# Patient Record
Sex: MALE | Race: WHITE | URBAN - METROPOLITAN AREA
[De-identification: names, ages, dates, MRNs, and addresses within clinical notes are randomized per-mention and may not be internally consistent; named-entity substitution may affect disease eponyms.]

---

## 2017-05-10 ENCOUNTER — HISTORICAL (OUTPATIENT)
Dept: LAB | Facility: HOSPITAL | Age: 62
End: 2017-05-10

## 2017-05-10 ENCOUNTER — HISTORICAL (OUTPATIENT)
Dept: ADMINISTRATIVE | Facility: HOSPITAL | Age: 62
End: 2017-05-10

## 2017-05-10 LAB — GRAM STN SPEC: NORMAL

## 2017-05-15 LAB — FINAL CULTURE: NORMAL

## 2018-05-02 ENCOUNTER — HISTORICAL (OUTPATIENT)
Dept: ADMINISTRATIVE | Facility: HOSPITAL | Age: 63
End: 2018-05-02

## 2018-05-02 ENCOUNTER — HISTORICAL (OUTPATIENT)
Dept: LAB | Facility: HOSPITAL | Age: 63
End: 2018-05-02

## 2018-05-02 LAB
ABS NEUT (OLG): 5.44 X10(3)/MCL (ref 2.1–9.2)
ALBUMIN SERPL-MCNC: 3.5 GM/DL (ref 3.4–5)
ALBUMIN/GLOB SERPL: 0.9 {RATIO}
ALP SERPL-CCNC: 110 UNIT/L (ref 50–136)
ALT SERPL-CCNC: 31 UNIT/L (ref 12–78)
AST SERPL-CCNC: 28 UNIT/L (ref 15–37)
BASOPHILS # BLD AUTO: 0 X10(3)/MCL (ref 0–0.2)
BASOPHILS NFR BLD AUTO: 1 %
BILIRUB SERPL-MCNC: 0.3 MG/DL (ref 0.2–1)
BILIRUBIN DIRECT+TOT PNL SERPL-MCNC: 0.1 MG/DL (ref 0–0.2)
BILIRUBIN DIRECT+TOT PNL SERPL-MCNC: 0.2 MG/DL (ref 0–0.8)
BUN SERPL-MCNC: 19 MG/DL (ref 7–18)
CALCIUM SERPL-MCNC: 8.4 MG/DL (ref 8.5–10.1)
CHLORIDE SERPL-SCNC: 108 MMOL/L (ref 98–107)
CO2 SERPL-SCNC: 28 MMOL/L (ref 21–32)
CREAT SERPL-MCNC: 0.89 MG/DL (ref 0.7–1.3)
CRP SERPL HS-MCNC: 18.4 MG/L (ref 0–3)
EOSINOPHIL # BLD AUTO: 0.1 X10(3)/MCL (ref 0–0.9)
EOSINOPHIL NFR BLD AUTO: 1 %
ERYTHROCYTE [DISTWIDTH] IN BLOOD BY AUTOMATED COUNT: 12.5 % (ref 11.5–17)
ERYTHROCYTE [SEDIMENTATION RATE] IN BLOOD: 50 MM/HR (ref 0–15)
GLOBULIN SER-MCNC: 3.9 GM/DL (ref 2.4–3.5)
GLUCOSE SERPL-MCNC: 102 MG/DL (ref 74–106)
HCT VFR BLD AUTO: 41.2 % (ref 42–52)
HGB BLD-MCNC: 13.5 GM/DL (ref 14–18)
LYMPHOCYTES # BLD AUTO: 1.4 X10(3)/MCL (ref 0.6–4.6)
LYMPHOCYTES NFR BLD AUTO: 18 %
MCH RBC QN AUTO: 31.9 PG (ref 27–31)
MCHC RBC AUTO-ENTMCNC: 32.8 GM/DL (ref 33–36)
MCV RBC AUTO: 97.4 FL (ref 80–94)
MONOCYTES # BLD AUTO: 0.9 X10(3)/MCL (ref 0.1–1.3)
MONOCYTES NFR BLD AUTO: 11 %
NEUTROPHILS # BLD AUTO: 5.44 X10(3)/MCL (ref 1.4–7.9)
NEUTROPHILS NFR BLD AUTO: 69 %
PLATELET # BLD AUTO: 506 X10(3)/MCL (ref 130–400)
PMV BLD AUTO: 8.9 FL (ref 9.4–12.4)
POTASSIUM SERPL-SCNC: 4.6 MMOL/L (ref 3.5–5.1)
PROT SERPL-MCNC: 7.4 GM/DL (ref 6.4–8.2)
RBC # BLD AUTO: 4.23 X10(6)/MCL (ref 4.7–6.1)
SODIUM SERPL-SCNC: 139 MMOL/L (ref 136–145)
TRANSFERRIN SERPL-MCNC: 242 MG/DL (ref 200–360)
WBC # SPEC AUTO: 7.9 X10(3)/MCL (ref 4.5–11.5)

## 2018-05-03 LAB — GRAM STN SPEC: NORMAL

## 2018-05-06 LAB — FINAL CULTURE: NORMAL

## 2018-05-07 ENCOUNTER — HISTORICAL (OUTPATIENT)
Dept: LAB | Facility: HOSPITAL | Age: 63
End: 2018-05-07

## 2018-05-07 LAB
APPEARANCE, UA: CLEAR
APTT PPP: 39.5 SECOND(S) (ref 24.8–36.9)
BACTERIA SPEC CULT: NORMAL /HPF
BILIRUB UR QL STRIP: NEGATIVE
COLOR UR: YELLOW
GLUCOSE (UA): NEGATIVE
HGB UR QL STRIP: NEGATIVE
INR PPP: 1.09 (ref 0–1.27)
KETONES UR QL STRIP: NEGATIVE
LEUKOCYTE ESTERASE UR QL STRIP: NEGATIVE
NITRITE UR QL STRIP: NEGATIVE
PH UR STRIP: 5.5 [PH] (ref 5–9)
PROT UR QL STRIP: NEGATIVE
PROTHROMBIN TIME: 14.5 SECOND(S) (ref 12.2–14.7)
RBC #/AREA URNS HPF: NORMAL /[HPF]
SP GR UR STRIP: 1.02 (ref 1–1.03)
SQUAMOUS EPITHELIAL, UA: NORMAL
UROBILINOGEN UR STRIP-ACNC: 0.2
WBC #/AREA URNS HPF: NORMAL /[HPF]

## 2018-05-09 LAB — FINAL CULTURE: NORMAL

## 2018-05-30 ENCOUNTER — HISTORICAL (OUTPATIENT)
Dept: ADMINISTRATIVE | Facility: HOSPITAL | Age: 63
End: 2018-05-30

## 2018-10-08 ENCOUNTER — HISTORICAL (OUTPATIENT)
Dept: ADMINISTRATIVE | Facility: HOSPITAL | Age: 63
End: 2018-10-08

## 2019-10-09 ENCOUNTER — HISTORICAL (OUTPATIENT)
Dept: ADMINISTRATIVE | Facility: HOSPITAL | Age: 64
End: 2019-10-09

## 2021-04-26 ENCOUNTER — HISTORICAL (OUTPATIENT)
Dept: ADMINISTRATIVE | Facility: HOSPITAL | Age: 66
End: 2021-04-26

## 2021-05-20 ENCOUNTER — HISTORICAL (OUTPATIENT)
Dept: ADMINISTRATIVE | Facility: HOSPITAL | Age: 66
End: 2021-05-20

## 2021-05-22 LAB — FINAL CULTURE: NORMAL

## 2021-07-22 ENCOUNTER — HISTORICAL (OUTPATIENT)
Dept: ADMINISTRATIVE | Facility: HOSPITAL | Age: 66
End: 2021-07-22

## 2021-07-24 LAB — FINAL CULTURE: NORMAL

## 2021-09-16 ENCOUNTER — HISTORICAL (OUTPATIENT)
Dept: ADMINISTRATIVE | Facility: HOSPITAL | Age: 66
End: 2021-09-16

## 2021-09-16 LAB
ABS NEUT (OLG): 3.72 X10(3)/MCL (ref 2.1–9.2)
ALBUMIN SERPL-MCNC: 4.1 GM/DL (ref 3.4–4.8)
ALBUMIN/GLOB SERPL: 1.3 RATIO (ref 1.1–2)
ALP SERPL-CCNC: 138 UNIT/L (ref 40–150)
ALT SERPL-CCNC: 17 UNIT/L (ref 0–55)
AST SERPL-CCNC: 18 UNIT/L (ref 5–34)
BASOPHILS # BLD AUTO: 0.1 X10(3)/MCL (ref 0–0.2)
BASOPHILS NFR BLD AUTO: 1 %
BILIRUB SERPL-MCNC: 0.4 MG/DL
BILIRUBIN DIRECT+TOT PNL SERPL-MCNC: 0.2 MG/DL (ref 0–0.5)
BILIRUBIN DIRECT+TOT PNL SERPL-MCNC: 0.2 MG/DL (ref 0–0.8)
BUN SERPL-MCNC: 15.1 MG/DL (ref 8.4–25.7)
CALCIUM SERPL-MCNC: 9.2 MG/DL (ref 8.8–10)
CHLORIDE SERPL-SCNC: 106 MMOL/L (ref 98–107)
CO2 SERPL-SCNC: 22 MMOL/L (ref 23–31)
CREAT SERPL-MCNC: 0.85 MG/DL (ref 0.73–1.18)
EOSINOPHIL # BLD AUTO: 0.2 X10(3)/MCL (ref 0–0.9)
EOSINOPHIL NFR BLD AUTO: 3 %
ERYTHROCYTE [DISTWIDTH] IN BLOOD BY AUTOMATED COUNT: 14.3 % (ref 11.5–17)
ERYTHROCYTE [SEDIMENTATION RATE] IN BLOOD: 4 MM/HR (ref 0–15)
GLOBULIN SER-MCNC: 3.1 GM/DL (ref 2.4–3.5)
GLUCOSE SERPL-MCNC: 127 MG/DL (ref 82–115)
HCT VFR BLD AUTO: 42.6 % (ref 42–52)
HGB BLD-MCNC: 12.9 GM/DL (ref 14–18)
LYMPHOCYTES # BLD AUTO: 1.4 X10(3)/MCL (ref 0.6–4.6)
LYMPHOCYTES NFR BLD AUTO: 23 %
MCH RBC QN AUTO: 30.3 PG (ref 27–31)
MCHC RBC AUTO-ENTMCNC: 30.3 GM/DL (ref 33–36)
MCV RBC AUTO: 100 FL (ref 80–94)
MONOCYTES # BLD AUTO: 0.5 X10(3)/MCL (ref 0.1–1.3)
MONOCYTES NFR BLD AUTO: 9 %
NEUTROPHILS # BLD AUTO: 3.72 X10(3)/MCL (ref 2.1–9.2)
NEUTROPHILS NFR BLD AUTO: 62 %
PLATELET # BLD AUTO: 325 X10(3)/MCL (ref 130–400)
PMV BLD AUTO: 10.8 FL (ref 9.4–12.4)
POTASSIUM SERPL-SCNC: 4 MMOL/L (ref 3.5–5.1)
PROT SERPL-MCNC: 7.2 GM/DL (ref 5.8–7.6)
RBC # BLD AUTO: 4.26 X10(6)/MCL (ref 4.7–6.1)
SODIUM SERPL-SCNC: 141 MMOL/L (ref 136–145)
VANCOMYCIN TROUGH SERPL-MCNC: 17.6 UG/ML (ref 15–20)
WBC # SPEC AUTO: 6 X10(3)/MCL (ref 4.5–11.5)

## 2021-09-21 ENCOUNTER — HISTORICAL (OUTPATIENT)
Dept: ADMINISTRATIVE | Facility: HOSPITAL | Age: 66
End: 2021-09-21

## 2021-09-21 LAB
ABS NEUT (OLG): 3.81 X10(3)/MCL (ref 2.1–9.2)
ALBUMIN SERPL-MCNC: 4.4 GM/DL (ref 3.4–4.8)
ALBUMIN/GLOB SERPL: 1.3 RATIO (ref 1.1–2)
ALP SERPL-CCNC: 151 UNIT/L (ref 40–150)
ALT SERPL-CCNC: 21 UNIT/L (ref 0–55)
AST SERPL-CCNC: 20 UNIT/L (ref 5–34)
BASOPHILS # BLD AUTO: 0.1 X10(3)/MCL (ref 0–0.2)
BASOPHILS NFR BLD AUTO: 1 %
BILIRUB SERPL-MCNC: 0.3 MG/DL
BILIRUBIN DIRECT+TOT PNL SERPL-MCNC: 0.1 MG/DL (ref 0–0.5)
BILIRUBIN DIRECT+TOT PNL SERPL-MCNC: 0.2 MG/DL (ref 0–0.8)
BUN SERPL-MCNC: 20.5 MG/DL (ref 8.4–25.7)
CALCIUM SERPL-MCNC: 9.6 MG/DL (ref 8.8–10)
CHLORIDE SERPL-SCNC: 103 MMOL/L (ref 98–107)
CO2 SERPL-SCNC: 23 MMOL/L (ref 23–31)
CREAT SERPL-MCNC: 1 MG/DL (ref 0.73–1.18)
EOSINOPHIL # BLD AUTO: 0.2 X10(3)/MCL (ref 0–0.9)
EOSINOPHIL NFR BLD AUTO: 3 %
ERYTHROCYTE [DISTWIDTH] IN BLOOD BY AUTOMATED COUNT: 13.9 % (ref 11.5–17)
ERYTHROCYTE [SEDIMENTATION RATE] IN BLOOD: 5 MM/HR (ref 0–15)
GLOBULIN SER-MCNC: 3.3 GM/DL (ref 2.4–3.5)
GLUCOSE SERPL-MCNC: 97 MG/DL (ref 82–115)
HCT VFR BLD AUTO: 44.1 % (ref 42–52)
HGB BLD-MCNC: 14 GM/DL (ref 14–18)
LYMPHOCYTES # BLD AUTO: 1.4 X10(3)/MCL (ref 0.6–4.6)
LYMPHOCYTES NFR BLD AUTO: 22 %
MCH RBC QN AUTO: 31 PG (ref 27–31)
MCHC RBC AUTO-ENTMCNC: 31.7 GM/DL (ref 33–36)
MCV RBC AUTO: 97.8 FL (ref 80–94)
MONOCYTES # BLD AUTO: 0.8 X10(3)/MCL (ref 0.1–1.3)
MONOCYTES NFR BLD AUTO: 13 %
NEUTROPHILS # BLD AUTO: 3.81 X10(3)/MCL (ref 2.1–9.2)
NEUTROPHILS NFR BLD AUTO: 60 %
PLATELET # BLD AUTO: 342 X10(3)/MCL (ref 130–400)
PMV BLD AUTO: 10.1 FL (ref 9.4–12.4)
POTASSIUM SERPL-SCNC: 4.5 MMOL/L (ref 3.5–5.1)
PROT SERPL-MCNC: 7.7 GM/DL (ref 5.8–7.6)
RBC # BLD AUTO: 4.51 X10(6)/MCL (ref 4.7–6.1)
SODIUM SERPL-SCNC: 138 MMOL/L (ref 136–145)
VANCOMYCIN TROUGH SERPL-MCNC: 19.1 UG/ML (ref 15–20)
WBC # SPEC AUTO: 6.4 X10(3)/MCL (ref 4.5–11.5)

## 2021-09-23 ENCOUNTER — HISTORICAL (OUTPATIENT)
Dept: ADMINISTRATIVE | Facility: HOSPITAL | Age: 66
End: 2021-09-23

## 2021-09-23 LAB
ALBUMIN SERPL-MCNC: 4.1 GM/DL (ref 3.4–4.8)
ALBUMIN/GLOB SERPL: 1.3 RATIO (ref 1.1–2)
ALP SERPL-CCNC: 134 UNIT/L (ref 40–150)
ALT SERPL-CCNC: 20 UNIT/L (ref 0–55)
AST SERPL-CCNC: 18 UNIT/L (ref 5–34)
BILIRUB SERPL-MCNC: 0.4 MG/DL
BILIRUBIN DIRECT+TOT PNL SERPL-MCNC: 0.2 MG/DL (ref 0–0.5)
BILIRUBIN DIRECT+TOT PNL SERPL-MCNC: 0.2 MG/DL (ref 0–0.8)
BUN SERPL-MCNC: 16.5 MG/DL (ref 8.4–25.7)
CALCIUM SERPL-MCNC: 9.3 MG/DL (ref 8.8–10)
CHLORIDE SERPL-SCNC: 108 MMOL/L (ref 98–107)
CO2 SERPL-SCNC: 21 MMOL/L (ref 23–31)
CREAT SERPL-MCNC: 0.99 MG/DL (ref 0.73–1.18)
CRP SERPL-MCNC: 0 MG/DL (ref 0–1)
ERYTHROCYTE [DISTWIDTH] IN BLOOD BY AUTOMATED COUNT: 13.9 % (ref 11.5–17)
ERYTHROCYTE [SEDIMENTATION RATE] IN BLOOD: 13 MM/HR (ref 0–15)
GLOBULIN SER-MCNC: 3.1 GM/DL (ref 2.4–3.5)
GLUCOSE SERPL-MCNC: 137 MG/DL (ref 82–115)
HCT VFR BLD AUTO: 43.4 % (ref 42–52)
HGB BLD-MCNC: 13.7 GM/DL (ref 14–18)
MCH RBC QN AUTO: 30.6 PG (ref 27–31)
MCHC RBC AUTO-ENTMCNC: 31.6 GM/DL (ref 33–36)
MCV RBC AUTO: 97.1 FL (ref 80–94)
PLATELET # BLD AUTO: 333 X10(3)/MCL (ref 130–400)
PMV BLD AUTO: 9.9 FL (ref 9.4–12.4)
POTASSIUM SERPL-SCNC: 4.3 MMOL/L (ref 3.5–5.1)
PROT SERPL-MCNC: 7.2 GM/DL (ref 5.8–7.6)
RBC # BLD AUTO: 4.47 X10(6)/MCL (ref 4.7–6.1)
SODIUM SERPL-SCNC: 140 MMOL/L (ref 136–145)
VANCOMYCIN TROUGH SERPL-MCNC: 17.7 UG/ML (ref 15–20)
WBC # SPEC AUTO: 5.5 X10(3)/MCL (ref 4.5–11.5)

## 2021-09-28 ENCOUNTER — HISTORICAL (OUTPATIENT)
Dept: ADMINISTRATIVE | Facility: HOSPITAL | Age: 66
End: 2021-09-28

## 2021-09-30 LAB — FINAL CULTURE: NORMAL

## 2021-10-05 ENCOUNTER — HISTORICAL (OUTPATIENT)
Dept: ADMINISTRATIVE | Facility: HOSPITAL | Age: 66
End: 2021-10-05

## 2021-10-05 LAB
ALBUMIN SERPL-MCNC: 3.9 GM/DL (ref 3.4–4.8)
ALBUMIN/GLOB SERPL: 1.3 RATIO (ref 1.1–2)
ALP SERPL-CCNC: 116 UNIT/L (ref 40–150)
ALT SERPL-CCNC: 13 UNIT/L (ref 0–55)
AST SERPL-CCNC: 15 UNIT/L (ref 5–34)
BILIRUB SERPL-MCNC: 0.5 MG/DL
BILIRUBIN DIRECT+TOT PNL SERPL-MCNC: 0.2 MG/DL (ref 0–0.5)
BILIRUBIN DIRECT+TOT PNL SERPL-MCNC: 0.3 MG/DL (ref 0–0.8)
BUN SERPL-MCNC: 12.4 MG/DL (ref 8.4–25.7)
CALCIUM SERPL-MCNC: 8.8 MG/DL (ref 8.8–10)
CHLORIDE SERPL-SCNC: 107 MMOL/L (ref 98–107)
CO2 SERPL-SCNC: 23 MMOL/L (ref 23–31)
CREAT SERPL-MCNC: 0.86 MG/DL (ref 0.73–1.18)
ERYTHROCYTE [DISTWIDTH] IN BLOOD BY AUTOMATED COUNT: 13.2 % (ref 11.5–17)
ERYTHROCYTE [SEDIMENTATION RATE] IN BLOOD: 10 MM/HR (ref 0–15)
GLOBULIN SER-MCNC: 3 GM/DL (ref 2.4–3.5)
GLUCOSE SERPL-MCNC: 98 MG/DL (ref 82–115)
HCT VFR BLD AUTO: 41.6 % (ref 42–52)
HGB BLD-MCNC: 13 GM/DL (ref 14–18)
MCH RBC QN AUTO: 30.2 PG (ref 27–31)
MCHC RBC AUTO-ENTMCNC: 31.3 GM/DL (ref 33–36)
MCV RBC AUTO: 96.7 FL (ref 80–94)
PLATELET # BLD AUTO: 358 X10(3)/MCL (ref 130–400)
PMV BLD AUTO: 9.9 FL (ref 9.4–12.4)
POTASSIUM SERPL-SCNC: 4.2 MMOL/L (ref 3.5–5.1)
PROT SERPL-MCNC: 6.9 GM/DL (ref 5.8–7.6)
RBC # BLD AUTO: 4.3 X10(6)/MCL (ref 4.7–6.1)
SODIUM SERPL-SCNC: 143 MMOL/L (ref 136–145)
VANCOMYCIN TROUGH SERPL-MCNC: 7.1 UG/ML (ref 15–20)
WBC # SPEC AUTO: 5.4 X10(3)/MCL (ref 4.5–11.5)

## 2022-04-11 ENCOUNTER — HISTORICAL (OUTPATIENT)
Dept: ADMINISTRATIVE | Facility: HOSPITAL | Age: 67
End: 2022-04-11
Payer: MEDICARE

## 2022-04-27 VITALS
DIASTOLIC BLOOD PRESSURE: 83 MMHG | SYSTOLIC BLOOD PRESSURE: 132 MMHG | WEIGHT: 202 LBS | BODY MASS INDEX: 29.92 KG/M2 | HEIGHT: 69 IN

## 2022-05-04 NOTE — HISTORICAL [facility] CERNER
This is a historical note converted from Preeti. Formatting and pictures may have been removed.  Please reference Preeti for original formatting and attached multimedia. Chief Complaint  Pt is here for post op left knee revision.  History of Present Illness  Currently on cefepime and vancomycin?intravenously of his?infected left knee.? He is 2 weeks postop from debridement and removal of the prosthesis with placement of a?vancomycin?and gentamicin?cement spacer. ?Overall he is doing well with decreased pain.  Review of Systems  Systemic: No fever, no chills, and no recent weight change.  Head: No headache - frequent.  Eyes: No vision problems.  Otolarnygeal: No hearing loss, no earache, no epistaxis, no hoarseness, and no tooth pain. Gums normal.  Cardiovascular: No chest pain or discomfort and no palpitations.  Pulmonary: No pulmonary symptoms - difficulty sleeping, no dyspnea, and cough not worse in the morning.  Gastrointestinal: Appetite not decreased. No dysphagia and no constant eructation. No nausea, no vomiting, no abdominal pain, no hematochezia, and no loose/mushy stools - frequent. No constipation - frequent.  Genitourinary: No genitourinary symptoms - Getting up every night to urinate and no increase in urinary frequency. No urinary hesitancy. No urinary loss of control - difficulty stopping urination and no burning sensation during urination.  Musculoskeletal: No calf muscle cramps and no localized soft tissue swelling of the ankle.  Neurological: No fainting and no convulsions.  Psychological: Not feeling nervous tension, not feeling nervous from exhaustion, and no depression.  Skin: No rash. Previous history of no ulcers.  Physical Exam  Vitals & Measurements  BP:?118/80?  HT:?175?cm? HT:?175?cm? WT:?96.8?kg? WT:?96.8?kg? BMI:?31.61?  ?  ? Osteoarthritis Localized Primary - Knee left  ?  ?  HISTORY OF PRESENT ILLNESS  ? Feeling as well as can be expected ? No fever ? No chills  ? Knee joint pain ?  Knee joint swelling ? Knee joint stiffness  ? No sensory disturbances ? No sleep disturbances  ?  PHYSICAL FINDINGS  Cardiovascular:  Arterial Pulses: ? Dorsalis pedis pulses were normal left  Musculoskeletal System:  Knee:  General/bilateral: ? Swelling of the knee. ? Pain was elicited by motion of the knee. ? No warmth of the knee. ? No instability of the knees. ? Knees demonstrated no muscle weakness.? Healed wound from?surgery 15 years ago on the posterior aspect of his left knee in the popliteal space.  ?  Left Knee:  Knee Motion: Value  Active flexion 45_ degrees  Active extension 5_ degrees  Neurological:  Sensation: ? No sensory exam abnormalities were noted.  Motor (Strength): ? No weakness of the knee was observed.  Balance: ? Normal.  Gait And Stance: ? Abnormal.  Skin:  ? No cellulitis.  Wound healing normal. staples C/D/I. Staples removed.  ?  ?  TESTS  Imaging:  X-Ray Knee:  AP and lateral view x-rays of the left knee with sunrise view of the patella were performed -of left knee.  ?  ?  IMPRESSIONS RADIOLOGY TEST  X-ray of knee was performed intact left knee articulating spacer?and x-ray of knee was performed intact left knee implant.  Assessment/Plan  1.?Acquired absence of knee joint following removal of joint prosthesis with presence of antibiotic-impregnated cement spacer  Ordered:  Clinic Follow up, *Est. 06/30/18 3:00:00 CDT, Order for future visit, Acquired absence of knee joint following removal of joint prosthesis with presence of antibiotic-impregnated cement spacer  Infection of prosthetic left knee joint, [facility]  Post-Op follow-up visit [id number] PC, Acquired absence of knee joint following removal of joint prosthesis with presence of antibiotic-impregnated cement spacer  Infection of prosthetic left knee joint, [facility], 05/30/18 15:35:00 CDT  XR Knee Left 1 or 2 Views, Routine, *Est. 06/30/18 3:00:00 CDT, Post-Op, None, Ambulatory, Patient Has IV?, Rad Type,  Order for future visit, Acquired absence of knee joint following removal of joint prosthesis with presence of antibiotic-impregnated cement spacer  Infection o...  ?  2.?Infection of prosthetic left knee joint  Ordered:  Clinic Follow up, *Est. 06/30/18 3:00:00 CDT, Order for future visit, Acquired absence of knee joint following removal of joint prosthesis with presence of antibiotic-impregnated cement spacer  Infection of prosthetic left knee joint, [facility]  Post-Op follow-up visit [id number] PC, Acquired absence of knee joint following removal of joint prosthesis with presence of antibiotic-impregnated cement spacer  Infection of prosthetic left knee joint, [facility], 05/30/18 15:35:00 CDT  XR Knee Left 1 or 2 Views, Routine, *Est. 06/30/18 3:00:00 CDT, Post-Op, None, Ambulatory, Patient Has IV?, Rad Type, Order for future visit, Acquired absence of knee joint following removal of joint prosthesis with presence of antibiotic-impregnated cement spacer  Infection o...  ?  Status post revision of total replacement of left knee  ?  Orders:  XR Knee Left 1 or 2 Views, Routine, 05/30/18 15:36:00 CDT, post-op, None, Ambulatory, Rad Type, 05/30/18 15:36:00 CDT   Problem List/Past Medical History  Ongoing  Acquired absence of knee joint following removal of joint prosthesis with presence of antibiotic-impregnated cement spacer  Depression  Effusion of left knee joint  History of total left knee replacement  Infection of prosthetic left knee joint  Obesity  Historical  No qualifying data  Procedure/Surgical History  Excision of Left Knee Joint, Open Approach (05/15/2018), Insertion of Spacer into Left Knee Joint, Open Approach (05/15/2018), Removal of Synthetic Substitute from Left Knee Joint, Open Approach (05/15/2018), Total Knee Revision (Left) (05/15/2018), baker cyst left knee, BILATERAL TOTAL KNEE REPLACEMENT, LEFT KNEE SCOPE, Tonsillectomy.  Medications  aspirin 81 mg oral Delayed Release  (EC) tablet, 81 mg= 1 tab(s), Oral, BID  BUPROPION HCL  MG TABLET  CATHFLO ACTIVASE 2 MG VIAL  cefepime, 2 gm= 1 EA, IV Piggyback, [id number]  CEFEPIME HCL 2 GRAM VIAL  Colace 100 mg oral capsule, 200 mg= 2 cap(s), Oral, Daily  LEVOCETIRIZINE 5 MG TABLET  MiraLax oral powder for reconstitution, 17 gm, Oral, Daily  Norco 10 mg-325 mg oral tablet, 1 tab(s), Oral, TID, PRN  OXYCODONE-ACETAMINOPHEN , 1 tab(s), Oral, q4hr  Sodium Chloride 0.9% PF vial (For PICC Flush), 10 mL, IV Push, q12hr  Sodium Chloride 0.9% PF vial (For PICC Flush), 20 mL, IV Push, As Directed, PRN  Sodium Chloride 0.9% PF vial (For PICC Flush), 10 mL, IV Push, As Directed, PRN  VANCOMYCIN HCL 5 GM VIAL  Allergies  No Known Medication Allergies  Social History  Alcohol  Current, Beer, 1-2 times per month, 05/10/2017  Employment/School  Retired, 05/10/2017  Home/Environment  Lives with Spouse., 05/10/2017  Substance Abuse  Never, 05/10/2017  Tobacco  Never smoker, 05/10/2017  Family History  Family history is unknown

## 2022-05-04 NOTE — HISTORICAL [facility] CERNER
This is a historical note converted from Preeti. Formatting and pictures may have been removed.  Please reference Preeti for original formatting and attached multimedia. Chief Complaint  Left Knee pain. ?History of Revision Left Total Knee on 9/20/18. ?He hit his knee last year on his hitch of his truck and has a bump. ?He still cant bend his knee at all. ?Saw his PCP last week and tried to get somthing out but was unsuccessful, on ABX.  History of Present Illness  Patient is here for follow-up for his revision left total knee arthroplasty?from 3 years ago.? Complains of?chronic stiffness.  Review of Systems  Systemic: No fever, no chills, and no recent weight change.  Head: No headache - frequent.  Eyes: No vision problems.  Otolarnygeal: No hearing loss, no earache, no epistaxis, no hoarseness, and no tooth pain. Gums normal.  Cardiovascular: No chest pain or discomfort and no palpitations.  Pulmonary: No pulmonary symptoms - difficulty sleeping, no dyspnea, and cough not worse in the morning.  Gastrointestinal: Appetite not decreased. No dysphagia and no constant eructation. No nausea, no vomiting, no abdominal pain, no hematochezia, and no loose/mushy stools - frequent. No constipation - frequent.  Genitourinary: No genitourinary symptoms - Getting up every night to urinate and no increase in urinary frequency. No urinary hesitancy. No urinary loss of control - difficulty stopping urination and no burning sensation during urination.  Musculoskeletal: No calf muscle cramps and no localized soft tissue swelling of the ankle.  Neurological: No fainting and no convulsions.  Psychological: Not feeling nervous tension, not feeling nervous from exhaustion, and no depression.  Skin: No rash. Previous history of no ulcers.  ?  Physical Exam  Vitals & Measurements  T:?97.4? ?F (Oral)? BP:?132/83?  HT:?176.00?cm? WT:?91.620?kg? BMI:?29.58?  Left knee exam:  Chronic swelling  No increased heat  No erythema  Healed  incision  Small area of?localized swelling?over the?anteromedial aspect?of the proximal tibia?from where he states?he struck a trailer hitch against his knee?several months ago. ?He states this localized area of swelling has never gone down. ?He states?Dr. Villatoro?nurse practitioner?aspirated the?the area of swelling last week?with only a few drops of blood?coming out.? He states?she?placed him on Bactrim twice daily?last week?for a 10-day course.  Stable collateral ligaments  Range of motion actively and passively 0degrees to 60 degrees  Antalgic gait  2+ pulses  Intact sensation and motor function  Radiographs of the left knee show?pristine interfaces and a stable well aligned revision left total knee arthroplasty. ?No evidence of loosening.? No fracture. ?No dislocation.  ?  Assessment/Plan  1.?Painful total knee replacement, left?T84.84XA  ?The patient?underwent a two-stage revision left total knee arthroplasty 3 years ago.? He had infection at that time?thus requiring the two-stage revision.? He has had postoperative stiffness since his operation.? Certainly the possibility of?continued/recurrent infection?is there. ?If this were the case, as I have discussed with him before amputation?would be an option.? There is not much more other than amputation if it comes to that that I could offer him, therefore I recommended referral for?further treatment?to another specialist.? He will be referred to [place].? He has a brother who lives in [place] and would like to go there.  Ordered:  Office/Outpatient Visit Level 3 Established [id number] PC, Painful total knee replacement, left, [facility], 04/26/21 9:59:00 CDT  ?   Problem List/Past Medical History  Ongoing  Acid reflux  Acquired absence of knee joint following removal of joint prosthesis with presence of antibiotic-impregnated cement spacer  Depression  Effusion of left knee joint  History of total left knee replacement  Infection of prosthetic left knee  joint  Obesity  Painful total knee replacement, left  Historical  No qualifying data  Procedure/Surgical History  Removal of Spacer from Right Knee Joint, Open Approach (09/20/2018)  Removal of Synthetic Substitute from Left Knee Joint, Open Approach (09/20/2018)  Replacement of Left Knee Joint with Synthetic Substitute, Cemented, Open Approach (09/20/2018)  Total Knee Revision (Left) (09/20/2018)  Excision of Left Knee Joint, Open Approach (05/15/2018)  Insertion of Spacer into Left Knee Joint, Open Approach (05/15/2018)  Removal of Synthetic Substitute from Left Knee Joint, Open Approach (05/15/2018)  Total Knee Revision (Left) (05/15/2018)  baker cyst left knee  BILATERAL TOTAL KNEE REPLACEMENT  LEFT KNEE SCOPE  Tonsillectomy   Medications  anastrozole 1 mg oral tablet, 1 mg= 1 tab(s), Oral, 3x/Wk  aspirin 81 mg oral Delayed Release (EC) tablet, 81 mg= 1 tab(s), Oral, BID,? ?Not taking  BUPROPION HCL  MG TABLET, 150 mg= 1 tab(s), Oral, Daily  CeleBREX 200 mg oral capsule, 200 mg= 1 cap(s), Oral, Daily, 3 refills  Colace 100 mg oral capsule, 200 mg= 2 cap(s), Oral, Daily,? ?Not taking  Decara 25,000 intl units (625 mcg) oral capsule, 78215 IntUnit= 1 cap(s), Oral, qWeek  Prevacid 15 mg oral DR capsule, 15 mg= 1 cap(s), Oral, Daily  sulfamethoxazole-trimethoprim 800 mg-160 mg oral tablet, 1 tab(s), Oral, q12hr  tadalafil 5 mg oral tablet, 5 mg= 1 tab(s), Oral, Daily  traZODONE 50 mg oral tablet ( Desyrel ), 50 mg= 1 tab(s), Oral, qPM  Tylenol 8 HR Arthritis Pain 650 mg oral tablet, extended release, 1300 mg= 2 tab(s), Oral, BID, PRN,? ?Not taking: stopped for sx  Allergies  No Known Medication Allergies  Social History  Abuse/Neglect  No, 04/26/2021  Alcohol  Current, Beer, 1-2 times per month, 05/10/2017  Employment/School  Retired, 05/10/2017  Home/Environment  Lives with Spouse., 05/10/2017  Substance Use  Never, 05/10/2017  Tobacco  Never (less than 100 in lifetime), N/A, 04/26/2021  Family  History  Family history is unknown  Health Maintenance  Health Maintenance  ???Pending?(in the next year)  ??? ??OverDue  ??? ? ? ?Aspirin Therapy for CVD Prevention due??09/21/19??and every 1??year(s)  ??? ? ? ?Depression Screening due??01/09/20??and every 1??year(s)  ??? ? ? ?Influenza Vaccine due??10/01/20??and every 1??day(s)  ??? ? ? ?Advance Directive due??01/02/21??and every 1??year(s)  ??? ? ? ?Cognitive Screening due??01/02/21??and every 1??year(s)  ??? ? ? ?Functional Assessment due??01/02/21??and every 1??year(s)  ??? ? ? ?Hypertension Management-BMP due??04/21/21??and every 1??year(s)  ??? ??Due?  ??? ? ? ?ADL Screening due??04/26/21??and every 1??year(s)  ??? ? ? ?Colorectal Screening due??04/26/21??Unknown Frequency  ??? ? ? ?Medicare Annual Wellness Exam due??04/26/21??and every 1??year(s)  ??? ? ? ?Pneumococcal Vaccine due??04/26/21??Unknown Frequency  ??? ? ? ?Tetanus Vaccine due??04/26/21??and every 10??year(s)  ??? ? ? ?Zoster Vaccine due??04/26/21??Unknown Frequency  ??? ??Due In Future?  ??? ? ? ?Obesity Screening not due until??01/01/22??and every 1??year(s)  ??? ? ? ?Alcohol Misuse Screening not due until??01/02/22??and every 1??year(s)  ??? ? ? ?Fall Risk Assessment not due until??01/02/22??and every 1??year(s)  ???Satisfied?(in the past 1 year)  ??? ??Satisfied?  ??? ? ? ?Alcohol Misuse Screening on??04/26/21.??Satisfied by Petra Abdul  ??? ? ? ?Blood Pressure Screening on??04/26/21.??Satisfied by Petra Abdul  ??? ? ? ?Body Mass Index Check on??04/26/21.??Satisfied by Petra Abdul  ??? ? ? ?Fall Risk Assessment on??04/26/21.??Satisfied by Petra Abdul  ??? ? ? ?Hypertension Management-Blood Pressure on??04/26/21.??Satisfied by Petra Abdul  ??? ? ? ?Obesity Screening on??04/26/21.??Satisfied by Petra Abdul  ?

## 2022-05-04 NOTE — HISTORICAL [facility] CERNER
This is a historical note converted from Preeti. Formatting and pictures may have been removed.  Please reference Preeti for original formatting and attached multimedia. Chief Complaint  9 month f/u left knee revision sx 09/20/18, xrays today....  History of Present Illness  Patient is?1 year postop from?revision left total knee arthroplasty after original removal of infected total knee arthroplasty and application of an antibiotic cement spacer.? He is ambulating with no assistance now. ?He has no pain but?when he does get sore he says Celebrex relieves his pain.? His only complaint is stiffness.  Review of Systems  Systemic: No fever, no chills, and no recent weight change.  Head: No headache - frequent.  Eyes: No vision problems.  Otolarnygeal: No hearing loss, no earache, no epistaxis, no hoarseness, and no tooth pain. Gums normal.  Cardiovascular: No chest pain or discomfort and no palpitations.  Pulmonary: No pulmonary symptoms - no dyspnea, no shortness of breath  Gastrointestinal: Appetite not decreased. No dysphagia and no constant eructation. No nausea, no vomiting, no abdominal pain, no hematochezia.  Genitourinary: No genitourinary symptoms - No urinary hesitancy. No urinary loss of control - no burning sensation during urination.  Musculoskeletal: No calf muscle cramps and no localized soft tissue swelling  Neurological: No fainting and no convulsions.  Psychological: no depression.  Skin: No rash.  Physical Exam  Cardiovascular:  Arterial Pulses: ? Dorsalis pedis pulses were normal.  Musculoskeletal System:  Left?knee:  General: ? No swelling of the knee. ? No warmth of the knee. ? No pain was elicited by motion of the knee. ? No instability of the knee. ? Knees demonstrated no muscle weakness.  Left?knee: ? Motion was abnormal.  Active flexion?75 degrees  Active extension 0 degrees  Neurological:  Sensation: ? Monofilament wire test of the leg/foot was normal.  Motor (Strength): ? No weakness of the  left knee was observed.  Skin:  ? No cellulitis. Surgical incision well healed ?  Tests  Imaging:  X-Ray Knee:  A complete knee x-ray with standing views was performed -of left knee.  Impressions Radiology Test  X-ray of knee was performed intact left knee implant.  Assessment/Plan  Status post total left knee replacement?Z96.652  ?Low impact exercising as tolerated.? NSAIDS as needed and f/u prn.? Dr Hawkins has examined patient and agrees with plan.   Problem List/Past Medical History  Ongoing  Acid reflux  Acquired absence of knee joint following removal of joint prosthesis with presence of antibiotic-impregnated cement spacer  Depression  Effusion of left knee joint  History of total left knee replacement  Infection of prosthetic left knee joint  Obesity  Historical  No qualifying data  Procedure/Surgical History  Removal of Spacer from Right Knee Joint, Open Approach (09/20/2018)  Removal of Synthetic Substitute from Left Knee Joint, Open Approach (09/20/2018)  Replacement of Left Knee Joint with Synthetic Substitute, Cemented, Open Approach (09/20/2018)  Total Knee Revision (Left) (09/20/2018)  Excision of Left Knee Joint, Open Approach (05/15/2018)  Insertion of Spacer into Left Knee Joint, Open Approach (05/15/2018)  Removal of Synthetic Substitute from Left Knee Joint, Open Approach (05/15/2018)  Total Knee Revision (Left) (05/15/2018)  baker cyst left knee  BILATERAL TOTAL KNEE REPLACEMENT  LEFT KNEE SCOPE  Tonsillectomy   Medications  aspirin 81 mg oral Delayed Release (EC) tablet, 81 mg= 1 tab(s), Oral, BID  BUPROPION HCL  MG TABLET, 150 mg= 1 tab(s), Oral, Daily  CeleBREX 200 mg oral capsule, 200 mg= 1 cap(s), Oral, Daily, 3 refills  Colace 100 mg oral capsule, 200 mg= 2 cap(s), Oral, Daily  Prevacid 15 mg oral DR capsule, 15 mg= 1 cap(s), Oral, Daily  Tylenol 8 HR Arthritis Pain 650 mg oral tablet, extended release, 1300 mg= 2 tab(s), Oral, BID, PRN,? ?Not taking: stopped for sx  Allergies  No  Known Medication Allergies  Social History  Alcohol  Current, Beer, 1-2 times per month, 05/10/2017  Employment/School  Retired, 05/10/2017  Home/Environment  Lives with Spouse., 05/10/2017  Substance Use  Never, 05/10/2017  Tobacco  Never (less than 100 in lifetime), N/A, 10/09/2019  Family History  Family history is unknown  Health Maintenance  Health Maintenance  ???Pending?(in the next year)  ??? ??OverDue  ??? ? ? ?Advance Directive due??01/01/19??and every 1??year(s)  ??? ? ? ?Alcohol Misuse Screening due??01/01/19??and every 1??year(s)  ??? ? ? ?Cognitive Screening due??01/01/19??and every 1??year(s)  ??? ? ? ?Functional Assessment due??01/01/19??and every 1??year(s)  ??? ? ? ?Geriatric Depression Screening due??01/01/19??and every 1??year(s)  ??? ??Due?  ??? ? ? ?Aspirin Therapy for CVD Prevention due??09/21/19??and every 1??year(s)  ??? ? ? ?ADL Screening due??10/09/19??and every 1??year(s)  ??? ? ? ?Colorectal Screening (Senior Wellness) due??10/09/19??and every?  ??? ? ? ?Pneumococcal Vaccine due??10/09/19??Variable frequency  ??? ? ? ?Pneumococcal Vaccine due??10/09/19??and every?  ??? ? ? ?Tetanus Vaccine due??10/09/19??and every 10??year(s)  ??? ? ? ?Zoster Vaccine due??10/09/19??and every 100??year(s)  ??? ??Due In Future?  ??? ? ? ?Fall Risk Assessment not due until??01/01/20??and every 1??year(s)  ??? ? ? ?Obesity Screening not due until??01/01/20??and every 1??year(s)  ???Satisfied?(in the past 1 year)  ??? ??Satisfied?  ??? ? ? ?Blood Pressure Screening on??01/09/19.??Satisfied by Valeria Saucedo LPN  ??? ? ? ?Body Mass Index Check on??01/09/19.??Satisfied by Valeria Saucedo LPN  ??? ? ? ?Depression Screening on??01/09/19.??Satisfied by Valeria Saucedo LPN  ??? ? ? ?Diabetes Screening on??08/16/19.??Satisfied by Tiffany Valera  ??? ? ? ?Fall Risk Assessment on??01/09/19.??Satisfied by Valeria Saucedo LPN  ??? ? ? ?Lipid Screening on??08/16/19.??Satisfied by Tiffany Valera  ??? ? ? ?Obesity Screening  on??01/09/19.??Satisfied by Valeria Saucedo LPN  ?

## 2022-05-04 NOTE — HISTORICAL [facility] CERNER
This is a historical note converted from [employer]. Formatting and pictures may have been removed.  Please reference [employer] for original formatting and attached multimedia. Chief Complaint  LEFT KNEE PAIN. WAS SEEN ABOUT 1 YEAR AGO. HAD BILATERAL KNEE REPLACEMENT ABOUT 6 YEARS AGO.  History of Present Illness  Left knee pain?and occasional swelling with activity?for about a year. ?No history of injury.? Bilateral knee replacements were 6 years ago.? No fever, no chills.? Pain is only occasionally and when it occurs it is a sharp pain but typically subsides  Physical Exam  Vitals & Measurements  BP:?180/88? HT:?175.26?cm? HT:?175.26?cm? WT:?95.25?kg? WT:?95.25?kg? BMI:?31.01?  ?  Cardiovascular:  Arterial Pulses: ? Dorsalis pedis pulses were normal.  Musculoskeletal System:  Left Knee:  General: ? Moderate effusion left knee?after sterile Betadine prep. ? No warmth of the knee. ? No pain was elicited by motion of the knee. ? No instability of the knee. ? Knees demonstrated no muscle weakness.  Left Knee: ? Motion was abnormal.  Active flexion _120 degrees  Active extension 0 degrees  Neurological:  Sensation: ? Monofilament wire test of the leg/foot was normal.  Motor (Strength): ? No weakness of the left knee was observed.  Skin:  ? No cellulitis. Surgical incision well healed ?  Tests  Imaging:  X-Ray Knee:  A complete knee x-ray with standing views was performed -of left knee.  Impressions Radiology Test  X-ray of knee was performed intact left knee implant.  ?   After a sterile Betadine prep and verbal consent?35 mL of clear viscous synovial fluid was aspirated. ?Patient tolerated procedure well. ?This will be sent for Gram stain, culture, sensitivity,?and synovial fluid analysis.  Assessment/Plan  1.?Status post total left knee replacement  Ordered:  Body Fluid Culture, Now collect, 05/10/17 9:45:00 CDT, Fluid, Knee L, Collected, by CLIENT, Micro Spec, Stop date 05/10/17 9:45:00 CDT, Status post total left knee  replacement  Gram Stain, Routine collect, 05/10/17 9:45:00 CDT, Fluid, Collected, by CLIENT, Micro Spec, Stop date 05/10/17 9:45:00 CDT, Status post total left knee replacement  Office/Outpatient Visit Level 3 Established [id number] PC, Status post total left knee replacement  Effusion of left knee joint, [facility], 05/10/17 9:59:00 CDT  ?  2.?Effusion of left knee joint  Ordered:  Office/Outpatient Visit Level 3 Established [id number] PC, Status post total left knee replacement  Effusion of left knee joint, [facility], 05/10/17 9:59:00 CDT  ?  Orders:  Clinic Follow-up PRN, 05/10/17 9:59:00 CDT, Future Order, [facility]   Problem List/Past Medical History  Depression  Effusion of left knee joint  Obesity  Historical  No historical problems  Procedure/Surgical History  BILATERAL TOTAL KNEE REPLACEMENT, LEFT KNEE SCOPE, Tonsillectomy.  Medications  BUPROPION HCL  MG TABLET  Allergies  No Known Medication Allergies  Social History  Alcohol  Current, Beer, 1-2 times per month  Employment/School  Retired  Home/Environment  Lives with Spouse.  Substance Abuse  Never  Tobacco  Never smoker

## 2022-05-04 NOTE — HISTORICAL [facility] CERNER
This is a historical note converted from Preeti. Formatting and pictures may have been removed.  Please reference Preeti for original formatting and attached multimedia. Chief Complaint  PATIENT HERE FOR LEFT KNEE PAIN AND SWELLING. REFERRED BY DR PATIÑO. CT AND NM BONE SCAN DONE. HE DOES HAVE SOME SORT OF RASH OR SOMETHING BEHIND HIS KNEE. ITS BEEN ABOUT 3 MONTHS  History of Present Illness  Patient is here for evaluation of worsening?heat and?pain and swelling in the left total knee arthroplasty?over the past several months. ?It has been bothering him for about 2 years but workup has been negative.? He states the left knee replacement was done 7 years ago?but 15 years ago he underwent?an arthroscopy and meniscectomy by Dr. Foley as well as an open?posterior approach?Baker cyst excision on the left knee?by Dr. Foley as well. ?He states that the posterior incision?had to be packed?and took?3 months?to heal?with persistent drainage over that time. ?I suspect he?had a low-grade latent infection that was dormant?in the posterior aspect of his left knee?that may have gotten into his left?total knee arthroplasty.? He denies any fevers or chills.  Review of Systems  Systemic: No fever, no chills, and no recent weight change.  Head: No headache - frequent.  Eyes: No vision problems.  Otolarnygeal: No hearing loss, no earache, no epistaxis, no hoarseness, and no tooth pain. Gums normal.  Cardiovascular: No chest pain or discomfort and no palpitations.  Pulmonary: No pulmonary symptoms - difficulty sleeping, no dyspnea, and cough not worse in the morning.  Gastrointestinal: Appetite not decreased. No dysphagia and no constant eructation. No nausea, no vomiting, no abdominal pain, no hematochezia, and no loose/mushy stools - frequent. No constipation - frequent.  Genitourinary: No genitourinary symptoms - Getting up every night to urinate and no increase in urinary frequency. No urinary hesitancy. No urinary loss of control -  difficulty stopping urination and no burning sensation during urination.  Musculoskeletal: No calf muscle cramps and no localized soft tissue swelling of the ankle.  Neurological: No fainting and no convulsions.  Psychological: Not feeling nervous tension, not feeling nervous from exhaustion, and no depression.  Skin: No rash. Previous history of no ulcers.  ?  Physical Exam  Vitals & Measurements  BP:?160/102?  HT:?175?cm? HT:?175?cm? WT:?99.33?kg? WT:?99.33?kg? BMI:?32.43?  ?  Cardiovascular:  Arterial Pulses: ? Dorsalis pedis pulses were normal.  Musculoskeletal System:  Left Knee:  General: ? 2+ effusion left knee. ? ?Mild increased heat left knee. ? No pain was elicited by motion of the knee. ? No instability of the knee. ? Knees demonstrated no muscle weakness.? No pathologic laxity of the collateral ligaments  Left Knee: ? Motion was abnormal.  Active flexion 100_ degrees  Active extension 0 degrees  Neurological:  Patient has a well-healed anterior midline scar with no wound breakdown  Patient has a posterior?transverse?scar?and erythematous rash?that is weepy?from?a?prior?Bakers cyst excision by Dr. Foley?15 years ago  Motor (Strength): ? No weakness of the left knee was observed.  Skin:  ? No cellulitis. Surgical incision well healed ?  Tests  Imaging:  X-Ray Knee:  A complete knee x-ray with standing views was performed -of left knee.  Impressions Radiology Test  X-ray of knee was performed intact left knee implant.? Pristine interfaces and a stable well aligned?left total knee arthroplasty on plain radiographs  Bone scan is reviewed and the patient  Has?increased femoral and tibial uptake around the prosthesis?on the left?total knee and a normal bone scan on the right knee?prosthesis.  ?   Gram stain culture and sensitivity from aspiration?of the left knee 1 year ago was normal?with clear synovial fluid  ?   ???  ???  Using sterile techniques after informed verbal consent. Risk discussed with patient  prior to injection  After sterile Betadine prep?and verbal consent?10 mL of cloudy?yellow?nonviscous synovial fluid was aspirated from the left knee?joint.? I am suspicious for?deep infection and?the fluid will be sent for stat Gram stain, culture, and sensitivity as well as a stat cell count with differential. ?Additionally will obtain blood work including a sed rate and C-reactive protein.  Assessment/Plan  1.?Effusion of left knee joint  ? Follow-up Monday to review?synovial fluid analysis?and blood work and to discuss?surgical options.  Ordered:  Lidocaine inj., 2 mL, Intra-Articular, Once, first dose 05/02/18 12:10:00 CDT, stop date 05/02/18 12:10:00 CDT  asp/inj jnt/bursa, major [id number] PC, 05/02/18 12:10:00 CDT, [facility], Routine, 05/02/18 12:10:00 CDT  Body Fluid Culture, Stat collect, 05/02/18 12:18:00 CDT, Order for future visit, Body Fluid, Knee L, Nurse collect, Stop date 05/02/18 12:18:00 CDT, Effusion of left knee joint  Cell Count w/ Diff Body Fluid, Stat collect, Body Fluid, Order for future visit, 05/02/18 12:18:00 CDT, Stop date 05/02/18 12:18:00 CDT, Nurse collect, Joint fluid, left knee, Effusion of left knee joint, 05/02/18 12:18:00 CDT  Clinic Follow up, *Est. 05/09/18 3:00:00 CDT, Order for future visit, Effusion of left knee joint, [facility]  Gram Stain, Stat collect, 05/02/18 12:18:00 CDT, Order for future visit, Body Fluid, Nurse collect, Stop date 05/02/18 12:18:00 CDT, Effusion of left knee joint  Office/Outpatient Visit Level 3 Established [id number] PC, Effusion of left knee joint, [facility], 05/02/18 12:10:00 CDT  ?  2.?History of total left knee replacement  ?  Left knee pain  Ordered:  XR Knee Left 4 or More Views, Routine, 05/02/18 11:44:00 CDT, Pain, None, Ambulatory, Rad Type, Left knee pain, 05/02/18 11:44:00 CDT  ?  Orders:  MRI Ext Lower Joint Right W/O Contrast, Routine, 05/02/18 10:57:00 CDT, [facility], None, Ambulatory, Rad Type, Schedule this  test, 05/02/18 10:57:00 CDT   Problem List/Past Medical History  Ongoing  Depression  Effusion of left knee joint  History of total left knee replacement  Obesity  Historical  No qualifying data  Procedure/Surgical History  BILATERAL TOTAL KNEE REPLACEMENT, LEFT KNEE SCOPE, Tonsillectomy.  Medications  BUPROPION HCL  MG TABLET  lidocaine 2% injectable solution, 2 mL, Intra-Articular, Once  Allergies  No Known Medication Allergies  Social History  Alcohol  Current, Beer, 1-2 times per month, 05/10/2017  Employment/School  Retired, 05/10/2017  Home/Environment  Lives with Spouse., 05/10/2017  Substance Abuse  Never, 05/10/2017  Tobacco  Never smoker, 05/10/2017  Family History  Family history is unknown